# Patient Record
Sex: FEMALE | Race: WHITE | NOT HISPANIC OR LATINO | URBAN - METROPOLITAN AREA
[De-identification: names, ages, dates, MRNs, and addresses within clinical notes are randomized per-mention and may not be internally consistent; named-entity substitution may affect disease eponyms.]

---

## 2022-07-05 ENCOUNTER — OFFICE VISIT (OUTPATIENT)
Dept: RHEUMATOLOGY | Facility: CLINIC | Age: 62
End: 2022-07-05
Payer: MEDICAID

## 2022-07-05 VITALS
TEMPERATURE: 98 F | OXYGEN SATURATION: 98 % | SYSTOLIC BLOOD PRESSURE: 124 MMHG | HEIGHT: 64 IN | BODY MASS INDEX: 36.6 KG/M2 | DIASTOLIC BLOOD PRESSURE: 80 MMHG | HEART RATE: 80 BPM | WEIGHT: 214.38 LBS | RESPIRATION RATE: 18 BRPM

## 2022-07-05 DIAGNOSIS — G47.00 INSOMNIA, UNSPECIFIED TYPE: ICD-10-CM

## 2022-07-05 DIAGNOSIS — M79.7 FIBROMYALGIA SYNDROME: ICD-10-CM

## 2022-07-05 DIAGNOSIS — M05.9 SEROPOSITIVE RHEUMATOID ARTHRITIS: Primary | ICD-10-CM

## 2022-07-05 PROCEDURE — 3079F PR MOST RECENT DIASTOLIC BLOOD PRESSURE 80-89 MM HG: ICD-10-PCS | Mod: CPTII,,, | Performed by: INTERNAL MEDICINE

## 2022-07-05 PROCEDURE — 99204 PR OFFICE/OUTPT VISIT, NEW, LEVL IV, 45-59 MIN: ICD-10-PCS | Mod: S$PBB,,, | Performed by: INTERNAL MEDICINE

## 2022-07-05 PROCEDURE — 99999 PR PBB SHADOW E&M-NEW PATIENT-LVL III: CPT | Mod: PBBFAC,,, | Performed by: INTERNAL MEDICINE

## 2022-07-05 PROCEDURE — 1160F RVW MEDS BY RX/DR IN RCRD: CPT | Mod: CPTII,,, | Performed by: INTERNAL MEDICINE

## 2022-07-05 PROCEDURE — 3079F DIAST BP 80-89 MM HG: CPT | Mod: CPTII,,, | Performed by: INTERNAL MEDICINE

## 2022-07-05 PROCEDURE — 1159F PR MEDICATION LIST DOCUMENTED IN MEDICAL RECORD: ICD-10-PCS | Mod: CPTII,,, | Performed by: INTERNAL MEDICINE

## 2022-07-05 PROCEDURE — 99204 OFFICE O/P NEW MOD 45 MIN: CPT | Mod: S$PBB,,, | Performed by: INTERNAL MEDICINE

## 2022-07-05 PROCEDURE — 1159F MED LIST DOCD IN RCRD: CPT | Mod: CPTII,,, | Performed by: INTERNAL MEDICINE

## 2022-07-05 PROCEDURE — 3008F BODY MASS INDEX DOCD: CPT | Mod: CPTII,,, | Performed by: INTERNAL MEDICINE

## 2022-07-05 PROCEDURE — 3074F PR MOST RECENT SYSTOLIC BLOOD PRESSURE < 130 MM HG: ICD-10-PCS | Mod: CPTII,,, | Performed by: INTERNAL MEDICINE

## 2022-07-05 PROCEDURE — 99999 PR PBB SHADOW E&M-NEW PATIENT-LVL III: ICD-10-PCS | Mod: PBBFAC,,, | Performed by: INTERNAL MEDICINE

## 2022-07-05 PROCEDURE — 3008F PR BODY MASS INDEX (BMI) DOCUMENTED: ICD-10-PCS | Mod: CPTII,,, | Performed by: INTERNAL MEDICINE

## 2022-07-05 PROCEDURE — 1160F PR REVIEW ALL MEDS BY PRESCRIBER/CLIN PHARMACIST DOCUMENTED: ICD-10-PCS | Mod: CPTII,,, | Performed by: INTERNAL MEDICINE

## 2022-07-05 PROCEDURE — 99203 OFFICE O/P NEW LOW 30 MIN: CPT | Mod: PBBFAC | Performed by: INTERNAL MEDICINE

## 2022-07-05 PROCEDURE — 3074F SYST BP LT 130 MM HG: CPT | Mod: CPTII,,, | Performed by: INTERNAL MEDICINE

## 2022-07-05 RX ORDER — TIZANIDINE 2 MG/1
2 TABLET ORAL NIGHTLY
Qty: 30 TABLET | Refills: 5 | Status: SHIPPED | OUTPATIENT
Start: 2022-07-05 | End: 2022-11-17 | Stop reason: SDUPTHER

## 2022-07-05 RX ORDER — PANTOPRAZOLE SODIUM 40 MG/1
40 TABLET, DELAYED RELEASE ORAL DAILY
COMMUNITY
Start: 2022-07-04

## 2022-07-05 RX ORDER — DILTIAZEM HYDROCHLORIDE 60 MG/1
60 TABLET, FILM COATED ORAL DAILY PRN
COMMUNITY
Start: 2022-05-30

## 2022-07-05 RX ORDER — HYDROXYCHLOROQUINE SULFATE 200 MG/1
200 TABLET, FILM COATED ORAL 2 TIMES DAILY
Qty: 60 TABLET | Refills: 5 | Status: SHIPPED | OUTPATIENT
Start: 2022-07-05 | End: 2022-11-17 | Stop reason: SDUPTHER

## 2022-07-05 RX ORDER — FUROSEMIDE 20 MG/1
20 TABLET ORAL DAILY
COMMUNITY
Start: 2022-06-17

## 2022-07-05 NOTE — PROGRESS NOTES
"Subjective:       Patient ID: Shayy Chavez is a 61 y.o. female.    Chief Complaint: New Patient Self Referral (RA workup)    The patient is complaining of joint pain involving the MCP PIP wrist elbow shoulders hips knees and ankles bilaterally.  The pain is 9/10 in intensity dull in quality and continuous.  That is associated with a morning stiffness lasting for more than 60 minutes.  Is also having difficulty maintaining a good night of sleep.  This has been associated with myalgias.  Muscle aches are 9/10 in intensity dull in quality and continuous.  They are associated with fatigue.  No fever no chills no others.    Review of Systems   Constitutional: Negative for appetite change, chills and fever.   HENT: Negative for congestion, ear pain, mouth sores, nosebleeds and trouble swallowing.    Eyes: Negative for photophobia and discharge.   Respiratory: Negative for chest tightness and shortness of breath.    Cardiovascular: Negative for chest pain.   Gastrointestinal: Negative for abdominal pain and vomiting.   Endocrine: Negative.    Genitourinary: Negative for hematuria.   Musculoskeletal:        As per HPI   Skin: Negative for rash.   Neurological: Negative for weakness.         Objective:   /80 (BP Location: Right arm, Patient Position: Sitting, BP Method: Medium (Automatic))   Pulse 80   Temp 97.9 °F (36.6 °C)   Resp 18   Ht 5' 4" (1.626 m)   Wt 97.3 kg (214 lb 6.4 oz)   SpO2 98%   BMI 36.80 kg/m²      Physical Exam   Constitutional: She is oriented to person, place, and time. She appears well-developed and well-nourished. No distress.   HENT:   Head: Normocephalic and atraumatic.   Right Ear: External ear normal.   Left Ear: External ear normal.   Eyes: Pupils are equal, round, and reactive to light.   Cardiovascular: Normal rate, regular rhythm and normal heart sounds.   Pulmonary/Chest: Breath sounds normal.   Abdominal: Soft. There is no abdominal tenderness.   Musculoskeletal:      " Right shoulder: Tenderness present.      Left shoulder: Tenderness present.      Right elbow: Tenderness present.      Left elbow: Tenderness present.      Right wrist: Tenderness present.      Left wrist: Tenderness present.      Cervical back: Neck supple.      Right hip: Tenderness present.      Left hip: Tenderness present.      Right knee: Tenderness present.      Left knee: Tenderness present.      Right ankle: Tenderness present.      Left ankle: Tenderness present.   Lymphadenopathy:     She has no cervical adenopathy.   Neurological: She is alert and oriented to person, place, and time. She displays normal reflexes. No cranial nerve deficit or sensory deficit. She exhibits normal muscle tone. Coordination normal.   Skin: No rash noted. No erythema.   Vitals reviewed.      Right Side Rheumatological Exam     The patient is tender to palpation of the shoulder, elbow, wrist, knee, 1st PIP, 1st MCP, 2nd PIP, 2nd MCP, 3rd PIP, 3rd MCP, 4th PIP, 4th MCP, 5th PIP, hip, ankle, 1st MTP, 2nd MTP, 3rd MTP, 4th MTP, 5th MTP, 1st toe IP, 2nd toe IP, 3rd toe IP, 4th toe IP and 5th toe IP    Left Side Rheumatological Exam     The patient is tender to palpation of the shoulder, elbow, wrist, knee, 1st PIP, 1st MCP, 2nd PIP, 2nd MCP, 3rd PIP, 3rd MCP, 4th PIP, 4th MCP, 5th PIP, 5th MCP, hip, ankle, 1st MTP, 2nd MTP, 3rd MTP, 4th MTP, 5th MTP, 1st toe IP, 2nd toe IP, 3rd toe IP, 4th toe IP and 5th toe IP.         Completed Fibromyalgia exam 18/18 tender points.  No data to display     Assessment:       1. Seropositive rheumatoid arthritis    2. Fibromyalgia syndrome    3. Insomnia, unspecified type            Plan:       Problem List Items Addressed This Visit    None     Visit Diagnoses     Seropositive rheumatoid arthritis    -  Primary    Relevant Medications    hydrOXYchloroQUINE (PLAQUENIL) 200 mg tablet    tiZANidine (ZANAFLEX) 2 MG tablet    Other Relevant Orders    CBC Auto Differential    Comprehensive Metabolic  Panel    CRP, High Sensitivity    Vitamin D    Urinalysis    Antinuclear Antibody (CHELSEA), HEp-2, IgG    Rheumatoid Quantitative    Cyclic Citrullinated Peptide Antibody, IgG    Anti-Smith Antibody    DSDNA IgG By IFA    Fibromyalgia syndrome        Relevant Medications    hydrOXYchloroQUINE (PLAQUENIL) 200 mg tablet    tiZANidine (ZANAFLEX) 2 MG tablet    Other Relevant Orders    CBC Auto Differential    Comprehensive Metabolic Panel    CRP, High Sensitivity    Vitamin D    Urinalysis    Antinuclear Antibody (CHELSEA), HEp-2, IgG    Rheumatoid Quantitative    Cyclic Citrullinated Peptide Antibody, IgG    Anti-Smith Antibody    DSDNA IgG By IFA    Insomnia, unspecified type        Relevant Medications    hydrOXYchloroQUINE (PLAQUENIL) 200 mg tablet    tiZANidine (ZANAFLEX) 2 MG tablet    Other Relevant Orders    CBC Auto Differential    Comprehensive Metabolic Panel    CRP, High Sensitivity    Vitamin D    Urinalysis    Antinuclear Antibody (CHELSEA), HEp-2, IgG    Rheumatoid Quantitative    Cyclic Citrullinated Peptide Antibody, IgG    Anti-Smith Antibody    DSDNA IgG By IFA

## 2022-11-16 PROBLEM — M79.7 FIBROMYALGIA: Status: ACTIVE | Noted: 2022-11-16

## 2022-11-16 PROBLEM — M05.9 SEROPOSITIVE RHEUMATOID ARTHRITIS: Status: ACTIVE | Noted: 2022-11-16

## 2022-11-16 PROBLEM — F51.01 PRIMARY INSOMNIA: Status: ACTIVE | Noted: 2022-11-16

## 2022-11-17 ENCOUNTER — OFFICE VISIT (OUTPATIENT)
Dept: RHEUMATOLOGY | Facility: CLINIC | Age: 62
End: 2022-11-17
Payer: MEDICAID

## 2022-11-17 VITALS
HEIGHT: 64 IN | WEIGHT: 220.38 LBS | BODY MASS INDEX: 37.62 KG/M2 | OXYGEN SATURATION: 97 % | RESPIRATION RATE: 18 BRPM | SYSTOLIC BLOOD PRESSURE: 114 MMHG | DIASTOLIC BLOOD PRESSURE: 75 MMHG | HEART RATE: 69 BPM | TEMPERATURE: 99 F

## 2022-11-17 DIAGNOSIS — M05.9 SEROPOSITIVE RHEUMATOID ARTHRITIS: ICD-10-CM

## 2022-11-17 DIAGNOSIS — M79.7 FIBROMYALGIA SYNDROME: ICD-10-CM

## 2022-11-17 DIAGNOSIS — G47.00 INSOMNIA, UNSPECIFIED TYPE: ICD-10-CM

## 2022-11-17 DIAGNOSIS — F51.01 PRIMARY INSOMNIA: ICD-10-CM

## 2022-11-17 DIAGNOSIS — M79.7 FIBROMYALGIA: ICD-10-CM

## 2022-11-17 PROCEDURE — 99213 OFFICE O/P EST LOW 20 MIN: CPT | Mod: PBBFAC | Performed by: INTERNAL MEDICINE

## 2022-11-17 PROCEDURE — 3078F DIAST BP <80 MM HG: CPT | Mod: CPTII,,, | Performed by: INTERNAL MEDICINE

## 2022-11-17 PROCEDURE — 99214 OFFICE O/P EST MOD 30 MIN: CPT | Mod: S$PBB,,, | Performed by: INTERNAL MEDICINE

## 2022-11-17 PROCEDURE — 99999 PR PBB SHADOW E&M-EST. PATIENT-LVL III: ICD-10-PCS | Mod: PBBFAC,,, | Performed by: INTERNAL MEDICINE

## 2022-11-17 PROCEDURE — 3008F PR BODY MASS INDEX (BMI) DOCUMENTED: ICD-10-PCS | Mod: CPTII,,, | Performed by: INTERNAL MEDICINE

## 2022-11-17 PROCEDURE — 3074F PR MOST RECENT SYSTOLIC BLOOD PRESSURE < 130 MM HG: ICD-10-PCS | Mod: CPTII,,, | Performed by: INTERNAL MEDICINE

## 2022-11-17 PROCEDURE — 1160F PR REVIEW ALL MEDS BY PRESCRIBER/CLIN PHARMACIST DOCUMENTED: ICD-10-PCS | Mod: CPTII,,, | Performed by: INTERNAL MEDICINE

## 2022-11-17 PROCEDURE — 99999 PR PBB SHADOW E&M-EST. PATIENT-LVL III: CPT | Mod: PBBFAC,,, | Performed by: INTERNAL MEDICINE

## 2022-11-17 PROCEDURE — 3074F SYST BP LT 130 MM HG: CPT | Mod: CPTII,,, | Performed by: INTERNAL MEDICINE

## 2022-11-17 PROCEDURE — 3078F PR MOST RECENT DIASTOLIC BLOOD PRESSURE < 80 MM HG: ICD-10-PCS | Mod: CPTII,,, | Performed by: INTERNAL MEDICINE

## 2022-11-17 PROCEDURE — 1159F PR MEDICATION LIST DOCUMENTED IN MEDICAL RECORD: ICD-10-PCS | Mod: CPTII,,, | Performed by: INTERNAL MEDICINE

## 2022-11-17 PROCEDURE — 1159F MED LIST DOCD IN RCRD: CPT | Mod: CPTII,,, | Performed by: INTERNAL MEDICINE

## 2022-11-17 PROCEDURE — 99214 PR OFFICE/OUTPT VISIT, EST, LEVL IV, 30-39 MIN: ICD-10-PCS | Mod: S$PBB,,, | Performed by: INTERNAL MEDICINE

## 2022-11-17 PROCEDURE — 1160F RVW MEDS BY RX/DR IN RCRD: CPT | Mod: CPTII,,, | Performed by: INTERNAL MEDICINE

## 2022-11-17 PROCEDURE — 3008F BODY MASS INDEX DOCD: CPT | Mod: CPTII,,, | Performed by: INTERNAL MEDICINE

## 2022-11-17 RX ORDER — TIZANIDINE 4 MG/1
4 TABLET ORAL NIGHTLY
Qty: 30 TABLET | Refills: 5 | Status: SHIPPED | OUTPATIENT
Start: 2022-11-17 | End: 2023-05-16

## 2022-11-17 RX ORDER — METHOTREXATE 2.5 MG/1
10 TABLET ORAL
Qty: 20 TABLET | Refills: 5 | Status: SHIPPED | OUTPATIENT
Start: 2022-11-17 | End: 2023-01-04

## 2022-11-17 RX ORDER — HYDROXYCHLOROQUINE SULFATE 200 MG/1
200 TABLET, FILM COATED ORAL 2 TIMES DAILY
Qty: 60 TABLET | Refills: 5 | Status: SHIPPED | OUTPATIENT
Start: 2022-11-17 | End: 2023-05-20

## 2022-11-17 RX ORDER — ALPRAZOLAM 0.25 MG/1
0.25 TABLET ORAL 2 TIMES DAILY PRN
COMMUNITY

## 2022-11-17 RX ORDER — FOLIC ACID 1 MG/1
1 TABLET ORAL DAILY
Qty: 30 TABLET | Refills: 5 | Status: SHIPPED | OUTPATIENT
Start: 2022-11-17 | End: 2023-05-16

## 2022-11-17 NOTE — PROGRESS NOTES
"Subjective:       Patient ID: Shayy Chavez is a 61 y.o. female.    Chief Complaint: Follow-up (Follow up. Pain in joints. Pt has nausea at times in the morning and at night after taking meds)    The patient is complaining of joint pain involving the MCP PIP wrist elbow shoulders hips knees and ankles bilaterally.  The pain is 4/10 in intensity dull in quality and continuous.  That is associated with a morning stiffness lasting for more than 60 minutes.  Is also having difficulty maintaining a good night of sleep.  This has been associated with myalgias.  Muscle aches are 4/10 in intensity dull in quality and continuous.  They are associated with fatigue.  No fever no chills no others.      Review of Systems   Constitutional:  Negative for appetite change, chills and fever.   HENT:  Negative for congestion, ear pain, mouth sores, nosebleeds and trouble swallowing.    Eyes:  Negative for photophobia and discharge.   Respiratory:  Negative for chest tightness and shortness of breath.    Cardiovascular:  Negative for chest pain.   Gastrointestinal:  Negative for abdominal pain and vomiting.   Endocrine: Negative.    Genitourinary:  Negative for hematuria.   Musculoskeletal:         As per HPI   Skin:  Negative for rash.   Neurological:  Negative for weakness.       Objective:   /75 (BP Location: Left arm, Patient Position: Sitting, BP Method: Medium (Automatic))   Pulse 69   Temp 98.7 °F (37.1 °C) (Oral)   Resp 18   Ht 5' 4" (1.626 m)   Wt 100 kg (220 lb 6.4 oz)   SpO2 97%   BMI 37.83 kg/m²      Physical Exam   Constitutional: She is oriented to person, place, and time. She appears well-developed and well-nourished. No distress.   HENT:   Head: Normocephalic and atraumatic.   Right Ear: External ear normal.   Left Ear: External ear normal.   Eyes: Pupils are equal, round, and reactive to light.   Cardiovascular: Normal rate, regular rhythm and normal heart sounds.   Pulmonary/Chest: Breath sounds " normal.   Abdominal: Soft. There is no abdominal tenderness.   Musculoskeletal:      Right shoulder: Tenderness present.      Left shoulder: Tenderness present.      Right elbow: Tenderness present.      Left elbow: Tenderness present.      Right wrist: Tenderness present.      Left wrist: Tenderness present.      Cervical back: Neck supple.      Right hip: Tenderness present.      Left hip: Tenderness present.      Right knee: Tenderness present.      Left knee: Tenderness present.      Right ankle: Tenderness present.      Left ankle: Tenderness present.   Lymphadenopathy:     She has no cervical adenopathy.   Neurological: She is alert and oriented to person, place, and time. She displays normal reflexes. No cranial nerve deficit or sensory deficit. She exhibits normal muscle tone. Coordination normal.   Skin: No rash noted. No erythema.   Vitals reviewed.      Right Side Rheumatological Exam     The patient is tender to palpation of the shoulder, elbow, wrist, knee, 1st PIP, 1st MCP, 2nd PIP, 2nd MCP, 3rd PIP, 3rd MCP, 4th PIP, 4th MCP, 5th PIP, hip, ankle, 1st MTP, 2nd MTP, 3rd MTP, 4th MTP, 5th MTP, 1st toe IP, 2nd toe IP, 3rd toe IP, 4th toe IP and 5th toe IP    Left Side Rheumatological Exam     The patient is tender to palpation of the shoulder, elbow, wrist, knee, 1st PIP, 1st MCP, 2nd PIP, 2nd MCP, 3rd PIP, 3rd MCP, 4th PIP, 4th MCP, 5th PIP, 5th MCP, hip, ankle, 1st MTP, 2nd MTP, 3rd MTP, 4th MTP, 5th MTP, 1st toe IP, 2nd toe IP, 3rd toe IP, 4th toe IP and 5th toe IP.       Completed Fibromyalgia exam 18/18 tender points.  No data to display     Assessment:       1. Seropositive rheumatoid arthritis    2. Fibromyalgia    3. Primary insomnia    4. Fibromyalgia syndrome    5. Insomnia, unspecified type            Medication List with Changes/Refills   New Medications    FOLIC ACID (FOLVITE) 1 MG TABLET    Take 1 tablet (1 mg total) by mouth once daily. After food       Start Date: 11/17/2022End Date:  5/16/2023    METHOTREXATE 2.5 MG TAB    Take 4 tablets (10 mg total) by mouth every 7 days. EVERY        TAKE 4 TAB TOGETHER AFTER SUPPER       Start Date: 11/17/2022End Date: 5/16/2023   Current Medications    ALPRAZOLAM (XANAX) 0.25 MG TABLET    Take 0.25 mg by mouth 2 (two) times daily as needed.       Start Date: --        End Date: --    DILTIAZEM (CARDIZEM) 60 MG TABLET    Take 60 mg by mouth daily as needed.       Start Date: 5/30/2022 End Date: --    FUROSEMIDE (LASIX) 20 MG TABLET    Take 20 mg by mouth once daily.       Start Date: 6/17/2022 End Date: --    PANTOPRAZOLE (PROTONIX) 40 MG TABLET    Take 40 mg by mouth once daily.       Start Date: 7/4/2022  End Date: --   Changed and/or Refilled Medications    Modified Medication Previous Medication    HYDROXYCHLOROQUINE (PLAQUENIL) 200 MG TABLET hydrOXYchloroQUINE (PLAQUENIL) 200 mg tablet       Take 1 tablet (200 mg total) by mouth 2 (two) times daily. After food    Take 1 tablet (200 mg total) by mouth 2 (two) times daily. After food       Start Date: 11/17/2022End Date: 5/20/2023    Start Date: 7/5/2022  End Date: 11/17/2022    TIZANIDINE (ZANAFLEX) 4 MG TABLET tiZANidine (ZANAFLEX) 2 MG tablet       Take 1 tablet (4 mg total) by mouth nightly.    Take 1 tablet (2 mg total) by mouth nightly.       Start Date: 11/17/2022End Date: 5/16/2023    Start Date: 7/5/2022  End Date: 11/17/2022         Plan:       Problem List Items Addressed This Visit          Immunology/Multi System    Seropositive rheumatoid arthritis    Relevant Medications    hydrOXYchloroQUINE (PLAQUENIL) 200 mg tablet    tiZANidine (ZANAFLEX) 4 MG tablet    methotrexate 2.5 MG Tab    folic acid (FOLVITE) 1 MG tablet       Orthopedic    Fibromyalgia    Relevant Medications    hydrOXYchloroQUINE (PLAQUENIL) 200 mg tablet    tiZANidine (ZANAFLEX) 4 MG tablet    methotrexate 2.5 MG Tab    folic acid (FOLVITE) 1 MG tablet       Other    Primary insomnia    Relevant Medications     hydrOXYchloroQUINE (PLAQUENIL) 200 mg tablet    tiZANidine (ZANAFLEX) 4 MG tablet    methotrexate 2.5 MG Tab    folic acid (FOLVITE) 1 MG tablet     Other Visit Diagnoses       Fibromyalgia syndrome        Relevant Medications    hydrOXYchloroQUINE (PLAQUENIL) 200 mg tablet    tiZANidine (ZANAFLEX) 4 MG tablet    methotrexate 2.5 MG Tab    folic acid (FOLVITE) 1 MG tablet    Insomnia, unspecified type        Relevant Medications    hydrOXYchloroQUINE (PLAQUENIL) 200 mg tablet    tiZANidine (ZANAFLEX) 4 MG tablet    methotrexate 2.5 MG Tab    folic acid (FOLVITE) 1 MG tablet

## 2022-12-21 ENCOUNTER — TELEPHONE (OUTPATIENT)
Dept: RHEUMATOLOGY | Facility: CLINIC | Age: 62
End: 2022-12-21
Payer: MEDICAID

## 2022-12-21 NOTE — TELEPHONE ENCOUNTER
Patient called stating that her Blood UREA Nitrogen was 27 and the EST Glomerular filtration rate was 87 should she be concerned ? She did read online and she read that this is signs of kidney damage ... Does the methotrexate has something to do with her rates being high?? Please Advise.. Thanks

## 2022-12-21 NOTE — TELEPHONE ENCOUNTER
I do not see any labs completed under results review.  Did she have these labs done with an [facility] facility?  If not, please have the patient send her labs to our office.

## 2022-12-22 NOTE — TELEPHONE ENCOUNTER
Patients outside labs are scanned into the chart.. could you please review.. Please Advise.. Thanks

## 2022-12-22 NOTE — TELEPHONE ENCOUNTER
Recent labs that were scanned into the chart were reviewed.  These blood tests look good.  The GFR decreases as people age. The GFR will decrease in people even without kidney issues. Your GFR of 87 is normal and actually pretty good for someone who is 62 years of age. The BUN is typically associated with dehydration and could be elevated (like in your case) from that. It is only mildly elevated so it is not concerning. We do not use primarily the BUN for kidney function. We use the creatinine and GFR, both of which are normal.  Your liver functions are also normal.

## 2023-01-04 ENCOUNTER — TELEPHONE (OUTPATIENT)
Dept: RHEUMATOLOGY | Facility: CLINIC | Age: 63
End: 2023-01-04
Payer: MEDICAID

## 2023-01-04 RX ORDER — LEFLUNOMIDE 20 MG/1
20 TABLET ORAL
Qty: 30 TABLET | Refills: 11 | Status: SHIPPED | OUTPATIENT
Start: 2023-01-04 | End: 2024-01-04

## 2023-01-04 NOTE — TELEPHONE ENCOUNTER
Patient states that since she started taking the methotrexate she has been swelling, nauseated and vomiting. She was wondering if you could recommend something else.  Thank you Kandis

## 2024-04-01 ENCOUNTER — TELEPHONE (OUTPATIENT)
Dept: RHEUMATOLOGY | Facility: CLINIC | Age: 64
End: 2024-04-01
Payer: MEDICAID

## 2024-04-05 ENCOUNTER — HOSPITAL ENCOUNTER (OUTPATIENT)
Dept: RADIOLOGY | Facility: HOSPITAL | Age: 64
Discharge: HOME | End: 2024-04-05
Payer: MEDICAID

## 2024-04-05 ENCOUNTER — OFFICE VISIT (OUTPATIENT)
Dept: RHEUMATOLOGY | Facility: CLINIC | Age: 64
End: 2024-04-05
Payer: MEDICAID

## 2024-04-05 VITALS
WEIGHT: 206.19 LBS | HEART RATE: 63 BPM | RESPIRATION RATE: 20 BRPM | OXYGEN SATURATION: 98 % | HEIGHT: 64 IN | TEMPERATURE: 98 F | DIASTOLIC BLOOD PRESSURE: 84 MMHG | SYSTOLIC BLOOD PRESSURE: 123 MMHG | BODY MASS INDEX: 35.2 KG/M2

## 2024-04-05 DIAGNOSIS — I48.91 ATRIAL FIBRILLATION, UNSPECIFIED TYPE: ICD-10-CM

## 2024-04-05 DIAGNOSIS — M06.00 SERONEGATIVE RHEUMATOID ARTHRITIS: ICD-10-CM

## 2024-04-05 DIAGNOSIS — E55.9 VITAMIN D DEFICIENCY: ICD-10-CM

## 2024-04-05 DIAGNOSIS — K21.9 GASTROESOPHAGEAL REFLUX DISEASE, UNSPECIFIED WHETHER ESOPHAGITIS PRESENT: ICD-10-CM

## 2024-04-05 DIAGNOSIS — M79.7 FIBROMYALGIA: ICD-10-CM

## 2024-04-05 DIAGNOSIS — F51.01 PRIMARY INSOMNIA: ICD-10-CM

## 2024-04-05 DIAGNOSIS — M06.00 SERONEGATIVE RHEUMATOID ARTHRITIS: Primary | ICD-10-CM

## 2024-04-05 DIAGNOSIS — E11.9 TYPE 2 DIABETES MELLITUS WITHOUT COMPLICATION, WITHOUT LONG-TERM CURRENT USE OF INSULIN: ICD-10-CM

## 2024-04-05 DIAGNOSIS — R76.8 POSITIVE ANA (ANTINUCLEAR ANTIBODY): ICD-10-CM

## 2024-04-05 PROCEDURE — 3079F DIAST BP 80-89 MM HG: CPT | Mod: CPTII,,,

## 2024-04-05 PROCEDURE — 71046 X-RAY EXAM CHEST 2 VIEWS: CPT | Mod: TC

## 2024-04-05 PROCEDURE — 73130 X-RAY EXAM OF HAND: CPT | Mod: TC,LT

## 2024-04-05 PROCEDURE — 73630 X-RAY EXAM OF FOOT: CPT | Mod: TC,RT

## 2024-04-05 PROCEDURE — 99999 PR PBB SHADOW E&M-EST. PATIENT-LVL V: CPT | Mod: PBBFAC,,,

## 2024-04-05 PROCEDURE — 1159F MED LIST DOCD IN RCRD: CPT | Mod: CPTII,,,

## 2024-04-05 PROCEDURE — 99215 OFFICE O/P EST HI 40 MIN: CPT | Mod: PBBFAC,25

## 2024-04-05 PROCEDURE — 73630 X-RAY EXAM OF FOOT: CPT | Mod: TC,LT

## 2024-04-05 PROCEDURE — 73130 X-RAY EXAM OF HAND: CPT | Mod: TC,RT

## 2024-04-05 PROCEDURE — 3074F SYST BP LT 130 MM HG: CPT | Mod: CPTII,,,

## 2024-04-05 PROCEDURE — 99215 OFFICE O/P EST HI 40 MIN: CPT | Mod: S$PBB,,,

## 2024-04-05 PROCEDURE — 3008F BODY MASS INDEX DOCD: CPT | Mod: CPTII,,,

## 2024-04-05 RX ORDER — DILTIAZEM HYDROCHLORIDE 30 MG/1
30 TABLET, FILM COATED ORAL DAILY
COMMUNITY

## 2024-04-05 RX ORDER — APIXABAN 5 MG/1
5 TABLET, FILM COATED ORAL 2 TIMES DAILY
COMMUNITY
Start: 2024-03-12

## 2024-04-05 RX ORDER — EMPAGLIFLOZIN 10 MG/1
10 TABLET, FILM COATED ORAL DAILY
COMMUNITY
Start: 2024-03-14

## 2024-04-05 NOTE — ASSESSMENT & PLAN NOTE
Past treatment: July 2022 Initiated on  mg BID at initial visit. In November 2022 initiated MTX. MTX was discontinued in January 2023 d/t side effects of nausea, vomiting and swelling. Leflunomide was Initiated Jan 2023    Will Check labs today- if OK the plan is to restart Leflunomide 20 mg daily after supper.     Lab today for continued monitoring and will include infectious labs for potential future biologic therapy  Request Cardiology office note and ECHO to see if we are able to use TNFi    Xray of bilateral hands, bilateral feet and CXR ordered today

## 2024-04-05 NOTE — PROGRESS NOTES
Subjective:           Patient ID: Shayy Chavez is a 63 y.o. female.    Chief Complaint: Follow-up, Pain, and Swelling (Two middle fingers on right hand locking up, having trouble holding and grasping things . Generalized joint pain and swelling . Patient is taking tylenol .)      Ms. Chavez is a 62 y/o female here for a f/u. She was originally a self referral for joint pain. She established care with Dr. Lam 7/5/22.  Reports hand pain started in 2012- reports she was diagnosed with RA from xrays and blood works.   Past labs: 5/13/22- Anti-centromere Ab neg, Anti-RNP neg, Anti Smith Ab neg, Eufemia-1 Ab neg, Ribosomal P Ab neg, Scleroderma Ab neg, Sjogrens SSA neg, Sjogrens SSB neg, SSA60 Ab IGG neg, CCP neg,    10/14/22- CHELSEA 1:80 Speckled, dsDNA neg, Smith neg, RF neg CCP neg, RF neg  Past treatment: July 2022 Initiated on  mg BID at initial visit. In November 2022 initiated MTX. MTX was discontinued in January 2023 d/t side effects of nausea, vomiting and swelling. Leflunomide was Initiated Jan 2023    Hx of MI 2011- stent   CHF?  Followed by Cardiology Dr Cordero- Cyndie CALERO on Eliquis and ASA  Followed by GI- Dr Merino    Denies history of fevers, rashes, photosensitivity, oral or nasal ulcers,stroke, seizures, h/o PE or DVT, Raynaud's phenomenon, uveitis, malignancies.      Family history of autoimmune disease: Brother and sister have RA  Pregnancies: 3  Miscarriages: 1 (3 months)  Smoking: Previous Smoker for 8-10 years. Quit 2011     Today: First time evaluated by me. Last visit was 1.5 years ago November 2022. She is not on any treatment. C/o of joint pain in hands, knees, ankles, shoulders. Bilateral hand are weak on . She tries to stay activity with walking in yard. Morning stiffness: more than 3-4 hours. Endorses red/warm/swollen joints. She is unable to wear rings regularly due to swelling. Active synovitis on exam today.  Intentional weight loss recently by eating healthy and  "increased activity. She also reports she recently completed physical therapy for her lower back with improvement.       Review of Systems   Constitutional:  Negative for appetite change, chills and fever.   HENT:  Negative for congestion, ear pain, mouth sores, nosebleeds and trouble swallowing.    Eyes:  Negative for photophobia and discharge.   Respiratory:  Negative for chest tightness and shortness of breath.    Cardiovascular:  Negative for chest pain.   Gastrointestinal:  Negative for abdominal pain and vomiting.   Endocrine: Negative.    Genitourinary:  Negative for hematuria.   Musculoskeletal:  Positive for arthralgias, joint swelling and myalgias.        As per HPI   Skin:  Negative for rash.   Neurological:  Positive for numbness. Negative for weakness.        Occasional tingling in bilateral hands         Objective:   /84 (BP Location: Right arm, Patient Position: Sitting, BP Method: Medium (Automatic))   Pulse 63   Temp 97.7 °F (36.5 °C) (Oral)   Resp 20   Ht 5' 4" (1.626 m)   Wt 93.5 kg (206 lb 3.2 oz)   SpO2 98%   BMI 35.39 kg/m²          Physical Exam   Constitutional: She is oriented to person, place, and time. She appears well-developed and well-nourished. No distress.   HENT:   Head: Normocephalic and atraumatic.   Right Ear: External ear normal.   Left Ear: External ear normal.   Eyes: Pupils are equal, round, and reactive to light.   Cardiovascular: Normal rate and normal heart sounds.   Pulmonary/Chest: Breath sounds normal.   Abdominal: Soft. There is no abdominal tenderness.   Musculoskeletal:      Right elbow: Tenderness present.      Left elbow: Tenderness present.      Right wrist: Tenderness present.      Left wrist: Tenderness present.      Cervical back: Neck supple.      Right knee: Tenderness present.      Left knee: Tenderness present.      Right ankle: Tenderness present.      Left ankle: Tenderness present.   Lymphadenopathy:     She has no cervical adenopathy. "   Neurological: She is alert and oriented to person, place, and time. She displays normal reflexes. No cranial nerve deficit or sensory deficit. She exhibits normal muscle tone. Coordination normal.   Skin: No rash noted. No erythema.   Vitals reviewed.      Right Side Rheumatological Exam     The patient is tender to palpation of the elbow, wrist, knee, 1st PIP, 1st MCP, 2nd PIP, 2nd MCP, 3rd PIP, 3rd MCP, 4th PIP, 4th MCP, 5th PIP, 5th MCP, ankle, 1st MTP, 2nd MTP, 3rd MTP, 4th MTP and 5th MTP    Left Side Rheumatological Exam     The patient is tender to palpation of the elbow, wrist, knee, 1st PIP, 1st MCP, 2nd PIP, 2nd MCP, 3rd PIP, 3rd MCP, 4th PIP, 4th MCP, 5th PIP, 5th MCP, ankle, 1st MTP, 2nd MTP, 3rd MTP, 4th MTP and 5th MTP.           There is currently no information documented on the homunculus. Go to the Rheumatology activity and complete the homunculus joint exam.    CDAI Score: --    No data to display     Assessment:         Medication List with Changes/Refills   Current Medications    ALPRAZOLAM (XANAX) 0.25 MG TABLET    Take 0.25 mg by mouth 2 (two) times daily as needed.       Start Date: --        End Date: --    DILTIAZEM (CARDIZEM) 30 MG TABLET    Take 30 mg by mouth Daily.       Start Date: --        End Date: --    ELIQUIS 5 MG TAB    Take 5 mg by mouth 2 (two) times daily.       Start Date: 3/12/2024 End Date: --    FUROSEMIDE (LASIX) 20 MG TABLET    Take 20 mg by mouth once daily.       Start Date: 6/17/2022 End Date: --    JARDIANCE 10 MG TABLET    Take 10 mg by mouth once daily.       Start Date: 3/14/2024 End Date: --    PANTOPRAZOLE (PROTONIX) 40 MG TABLET    Take 40 mg by mouth once daily.       Start Date: 7/4/2022  End Date: --   Discontinued Medications    DILTIAZEM (CARDIZEM) 60 MG TABLET    Take 60 mg by mouth daily as needed.       Start Date: 5/30/2022 End Date: 4/5/2024    FOLIC ACID (FOLVITE) 1 MG TABLET    Take 1 tablet (1 mg total) by mouth once daily. After food        Start Date: 11/17/2022End Date: 4/5/2024    LEFLUNOMIDE (ARAVA) 20 MG TAB    Take 1 tablet (20 mg total) by mouth daily with dinner or evening meal.       Start Date: 1/4/2023  End Date: 4/5/2024         ICD-10-CM ICD-9-CM   1. Seronegative rheumatoid arthritis  M06.00 714.0   2. Positive CHELSEA (antinuclear antibody)  R76.8 795.79   3. Fibromyalgia  M79.7 729.1   4. Primary insomnia  F51.01 307.42   5. Type 2 diabetes mellitus without complication, without long-term current use of insulin  E11.9 250.00   6. Atrial fibrillation, unspecified type  I48.91 427.31   7. Gastroesophageal reflux disease, unspecified whether esophagitis present  K21.9 530.81   8. Vitamin D deficiency  E55.9 268.9           Plan:       1. Seronegative rheumatoid arthritis  Assessment & Plan:  Past treatment: July 2022 Initiated on  mg BID at initial visit. In November 2022 initiated MTX. MTX was discontinued in January 2023 d/t side effects of nausea, vomiting and swelling. Leflunomide was Initiated Jan 2023    Will Check labs today- if OK the plan is to restart Leflunomide 20 mg daily after supper.     Lab today for continued monitoring and will include infectious labs for potential future biologic therapy  Request Cardiology office note and ECHO to see if we are able to use TNFi    Xray of bilateral hands, bilateral feet and CXR ordered today    Orders:  -     HIV 1/2 Ag/Ab (4th Gen); Future; Expected date: 04/05/2024  -     Quantiferon Gold TB; Future; Expected date: 04/05/2024  -     Hepatitis C Antibody; Future; Expected date: 04/05/2024  -     Hepatitis B Surface Antigen; Future; Expected date: 04/05/2024  -     Hepatitis B Core Antibody, Total; Future; Expected date: 04/05/2024  -     Hepatitis B Surface Ab, Qualitative; Future; Expected date: 04/05/2024  -     Sedimentation rate; Future; Expected date: 04/05/2024  -     C-Reactive Protein; Future; Expected date: 04/05/2024  -     Comprehensive Metabolic Panel; Future; Expected  date: 04/05/2024  -     CBC Auto Differential; Future; Expected date: 04/05/2024  -     Rheumatoid Factors, IgA, IgG, IgM; Future; Expected date: 04/05/2024  -     CHELSEA IgG by IFA; Future; Expected date: 04/05/2024  -     DSDNA; Future; Expected date: 04/05/2024  -     C3 Complement; Future; Expected date: 04/05/2024  -     C4 Complement; Future; Expected date: 04/05/2024  -     Miscellaneous Test, Sendout RNA polymerase III; Future; Expected date: 04/05/2024  -     X-Ray Hand 3 view Left; Future; Expected date: 04/05/2024  -     X-Ray Hand 3 view Right; Future; Expected date: 04/05/2024  -     X-Ray Foot Complete Right; Future; Expected date: 04/05/2024  -     X-Ray Foot Complete Left; Future; Expected date: 04/05/2024  -     X-Ray Chest PA And Lateral; Future; Expected date: 04/05/2024    2. Positive CHELSEA (antinuclear antibody)  Assessment & Plan:  CHELSEA 1:80 Speckled 2022    Labs ordered for workup of positive CHELSEA including HAM and complements    Orders:  -     Sedimentation rate; Future; Expected date: 04/05/2024  -     C-Reactive Protein; Future; Expected date: 04/05/2024  -     Comprehensive Metabolic Panel; Future; Expected date: 04/05/2024  -     CBC Auto Differential; Future; Expected date: 04/05/2024  -     CHELSEA IgG by IFA; Future; Expected date: 04/05/2024  -     DSDNA; Future; Expected date: 04/05/2024  -     C3 Complement; Future; Expected date: 04/05/2024  -     C4 Complement; Future; Expected date: 04/05/2024  -     Miscellaneous Test, Sendout RNA polymerase III; Future; Expected date: 04/05/2024    3. Fibromyalgia  Assessment & Plan:  Stable      4. Primary insomnia  Assessment & Plan:  Avoid caffeine, alcohol and stimulants.  Power down electronic devices at least one hour prior to bedtime.  Keep room dark; use eye mask or relaxation sound machine to promote rest.  Sleep hygiene refers to actions that tend to improve and maintain good sleep:  Sleep as long as necessary to feel rested (usually seven to  eight hours for adults) and then get out of bed  Maintain a regular sleep schedule, particularly a regular wake-up time in the morning  Avoid smoking or other nicotine intake, particularly during the evening          5. Type 2 diabetes mellitus without complication, without long-term current use of insulin    6. Atrial fibrillation, unspecified type  Assessment & Plan:  On Eliquis  Followed by Cardiology      7. Gastroesophageal reflux disease, unspecified whether esophagitis present    8. Vitamin D deficiency  Assessment & Plan:  Check Vit D today    Orders:  -     Vitamin D; Future; Expected date: 04/05/2024           42 minutes of total time spent on the encounter, which includes face to face time and non-face to face time preparing to see the patient (eg, review of tests), Obtaining and/or reviewing separately obtained history, Documenting clinical information in the electronic or other health record, Independently interpreting results (not separately reported) and communicating results to the patient/family/caregiver, or Care coordination (not separately reported).

## 2024-04-09 PROBLEM — E55.9 VITAMIN D DEFICIENCY: Status: ACTIVE | Noted: 2024-04-09

## 2024-04-09 PROBLEM — R76.8 POSITIVE ANA (ANTINUCLEAR ANTIBODY): Status: ACTIVE | Noted: 2024-04-09

## 2024-04-09 NOTE — ASSESSMENT & PLAN NOTE
CHELSEA 1:80 Speckled 2022    Labs ordered for workup of positive CHELSEA including HAM and complements

## 2024-04-10 ENCOUNTER — TELEPHONE (OUTPATIENT)
Dept: RHEUMATOLOGY | Facility: CLINIC | Age: 64
End: 2024-04-10
Payer: MEDICAID

## 2024-04-10 DIAGNOSIS — M06.00 SERONEGATIVE RHEUMATOID ARTHRITIS: Primary | ICD-10-CM

## 2024-04-10 RX ORDER — LEFLUNOMIDE 20 MG/1
20 TABLET ORAL DAILY
Qty: 30 TABLET | Refills: 5 | Status: SHIPPED | OUTPATIENT
Start: 2024-04-10

## 2024-04-10 NOTE — TELEPHONE ENCOUNTER
Labs reviewed- Vit D is slightly low- she can do OTC Vit D. All of her labs are within acceptable range.   Kidneys normal and liver enzymes are completely normal. Inflammation levels are elevated. The repeat CHELSEA is neg.   X-rays: Hand and feet xrays all show degenerative changes (we use these more for baseline) and the chest xray is completely normal.    The plan will be to start Leflunomide. It will be one tablet daily after supper. This rx was sent to her pharmacy.

## 2024-05-06 DIAGNOSIS — M06.00 SERONEGATIVE RHEUMATOID ARTHRITIS: Primary | ICD-10-CM

## 2024-05-06 DIAGNOSIS — M79.7 FIBROMYALGIA: ICD-10-CM

## 2024-05-06 RX ORDER — TIZANIDINE 4 MG/1
4 TABLET ORAL NIGHTLY
Qty: 30 TABLET | Refills: 5 | Status: SHIPPED | OUTPATIENT
Start: 2024-05-06 | End: 2024-05-06 | Stop reason: SDUPTHER

## 2024-05-06 NOTE — TELEPHONE ENCOUNTER
Agree, I do not see the TB test. The TB test needs to from the last 1 year. The TB test is ordered. So, if the patient does call back and cannot tell you where she had it completed, you can send the labs to the location of her preference (if needed)  Please update me, Once I see the negative TB then I will send the message to  for authorization.  I refilled the Tizanidine that Dr. Lam previously prescribed for her, we can discuss the ongoing management of this med at her next appointment.

## 2024-05-06 NOTE — TELEPHONE ENCOUNTER
The patient did agree to try the Humira and also is requesting to help with pain. She did request a muscle relaxer and I did inform her that you are unable to give that medication, is there any type of pain medication that can be given to the patient or would she need to get with her PCP.   The patient stated she did complete a TB test and it was faxed to our office, I checked in media and it was not there. So, I informed the patient to see where she completed the test and get them to fax it to our office or call the office back with the name of the office so we can request the labs. Please Advise

## 2024-05-06 NOTE — TELEPHONE ENCOUNTER
OK, if she is unable to tolerate the medication, the next stop in the upgrade in treatment is a biologic named Humira. It is an injection she would take every 2 weeks at home. I believe we discussed this possibility at her last visit. The only we would need is a negative tuberculosis test to start this medication. I see I ordered it with her blood work last month but that was the  only test not done. If she is amicable, I'll order the TB test and please ask her where she would like to have the lab completed and you can fax it there (she lives in [place], [place]).   Please also let her know that we received her cardiology notes.

## 2024-05-06 NOTE — TELEPHONE ENCOUNTER
Called patient and she did have a pharmacy change and she gave me the office to call. Spoke with [facility] and they will be faxing TB from 4/8/24.

## 2024-05-07 RX ORDER — TIZANIDINE 4 MG/1
4 TABLET ORAL NIGHTLY
Qty: 30 TABLET | Refills: 5 | Status: SHIPPED | OUTPATIENT
Start: 2024-05-07

## 2024-05-07 RX ORDER — ADALIMUMAB 40MG/0.4ML
40 KIT SUBCUTANEOUS
Qty: 2 PEN | Refills: 11 | Status: SHIPPED | OUTPATIENT
Start: 2024-05-07 | End: 2024-05-10 | Stop reason: SDUPTHER

## 2024-05-07 NOTE — TELEPHONE ENCOUNTER
TB result received (scanned into media). Humira Rx sent to  pharmacy and Tizanidine sent to [facility] [facility]

## 2024-05-10 DIAGNOSIS — M06.00 SERONEGATIVE RHEUMATOID ARTHRITIS: ICD-10-CM

## 2024-05-10 RX ORDER — ADALIMUMAB 40MG/0.4ML
40 KIT SUBCUTANEOUS
Qty: 2 PEN | Refills: 11 | Status: SHIPPED | OUTPATIENT
Start: 2024-05-10

## 2024-06-25 ENCOUNTER — TELEPHONE (OUTPATIENT)
Dept: RHEUMATOLOGY | Facility: CLINIC | Age: 64
End: 2024-06-25
Payer: MEDICAID

## 2024-06-25 NOTE — TELEPHONE ENCOUNTER
Patient started taking the Humira this would be her fourth injection. She has been retaining fluid in her hands and feet. She also has a lymph node under her right arm. She is now taking a antibiotic from her Pcp  MARAH Banda for the lymph node. Her pcp did advise her to call our office being that the Humira maybe the reasoning of her side effects.  Please Advise. Thanks

## 2024-06-25 NOTE — TELEPHONE ENCOUNTER
Spoke with patient she was told to stop the lasix 6 months ago  and re started it a week ago , when when she noticed she was swelling. She is requesting a refill of lasix and verbalized understanding of your message.

## 2024-06-25 NOTE — TELEPHONE ENCOUNTER
She should stop Humira if she has infection or on antibiotics. Please have her hold the Humira and continue antibiotic treatment for the infection. In addition, please ask her if the swelling in her hands just started, and ask her if she takes Lasix ever day (according to her current med list).    While she is holding the Humira, I will review this will the Rheumatology Clinic pharmacist and once we have the answers to the above questions we can call her back on Thursday.  Thanks

## 2024-06-26 NOTE — TELEPHONE ENCOUNTER
No, the Lasix is managed by her PCP or cardiologist. She needs to contact one of them to manage/prescribe the Lasix.    According to this phone encounter, she has been taking Humira for almost 6 weeks (she was about to take 4th injection) and noticed the swelling in her hands about 1 week ago and started taking Lasix again one week ago? Just confirming this is right for when the pharmacist reviews it tomorrow. We will be calling to discuss tomorrow.

## 2024-06-26 NOTE — TELEPHONE ENCOUNTER
Spoke with the patient, yes this would of been her fourth injection . She noticed the swelling about 2 weeks ago and started the lasix 2 weeks ago . She did verbalize understanding regarding the lasix refill and will contact her pcp.

## 2024-06-27 NOTE — TELEPHONE ENCOUNTER
"Called and spoke with the patient, she was made aware the rheumatologist pharmacist was present.  She reported her last dose of Humira was June 11th.  Reports she had swollen lymph node under right axilla and was treated with antibiotics with resultant.  After completing the antibiotics the lymph node enlarged again.  Reports she is being scheduled for an ultrasound for further workup by her PCP. She does report previously having a swollen lymph node when she first starting seeing Dr. Gray and that the lymph node enlarged when she started taking "medications for Arhtritis" but unsure what medication. In review, I am unable to find lymphadenopathy in previous Rheumatology notes from Dr. Lam. She does report the swelling in the hands started about 2 weeks ago.  She reports she was previously on Lasix every day in about 6 months ago she self discontinued the Lasix due to effects of Jardiance with eliminating increase fluid.  Reports Lasix was previously used for congestive heart failure.    In discussing the medications, she reports that she never received leflunomide from the pharmacy (it was sent in April) therefore has not started taking leflunomide.  She is unable to take prednisone due to AFib and retention of fluid.  She is instructed to complete workup for enlarged lymph node with primary care and to not start taking the leflunomide.  She should continue holding the Humira and instructed her to keep us updated so we can guide treatment for RA.  "

## 2024-07-10 NOTE — PROGRESS NOTES
Patient ID: [id number]     Chief Complaint: Follow-up (Stopped all medications due to a growth under her arm . She just completed antibiotics. . /She did find out the issue it is a cyst that will need to be surgically removed. Joint pain . )      HPI:     This is a telemedicine note. Patient was treated using telemedicine, real time audio and video, according to [facility] protocols. ISasha, conducted the visit from the [facility]. The patient participated in the visit at a non-[facility] location selected by the patient, identified below. I am licensed in the state where the patient stated they are located. The patient stated that they understood and accepted the privacy and security risks to their information at their location. This visit is not recorded.    Patient was located at the patient's home.      Shayy Chavez is a 63 y.o. female here today for a telemedicine visit.   Ms. Chavez is a 64 y/o female here for a f/u. She was originally a self referral for joint pain. She established care with Dr. Lam 7/5/22.  Reports hand pain started in 2012- reports she was diagnosed with RA from xrays and blood works.   Past labs: 5/13/22- Anti-centromere Ab neg, Anti-RNP neg, Anti Smith Ab neg, Eufemia-1 Ab neg, Ribosomal P Ab neg, Scleroderma Ab neg, Sjogrens SSA neg, Sjogrens SSB neg, SSA60 Ab IGG neg, CCP neg,    10/14/22- CHELSEA 1:80 Speckled, dsDNA neg, Smith neg, RF neg CCP neg, RF neg  Past treatment: July 2022 Initiated on  mg BID at initial visit. In November 2022 initiated MTX. MTX was discontinued in January 2023 d/t side effects of nausea, vomiting and swelling. Leflunomide was Initiated Jan 2023     Hx of MI 2011- stent   CHF? 9/1/2023- LV EF 55-60%, LVH, LAE, DD, mild MR/TR (echo on last page of scanned media) OK to use TNFi  Followed by Cardiology Dr Suzanne CALERO on Eliquis and ASA  Followed by GI- Dr Merino     Denies history of fevers, rashes,  photosensitivity, oral or nasal ulcers,stroke, seizures, h/o PE or DVT, Raynaud's phenomenon, uveitis, malignancies.      Family history of autoimmune disease: Brother and sister have RA  Pregnancies: 3  Miscarriages: 1 (3 months)  Smoking: Previous Smoker for 8-10 years. Quit 2011     Today: First time evaluated by me. Last visit was 1.5 years ago November 2022. She is not on any treatment. C/o of joint pain in hands, knees, ankles, shoulders. Bilateral hand are weak on . She tries to stay activity with walking in yard. Morning stiffness: more than 3-4 hours. Endorses red/warm/swollen joints. She is unable to wear rings regularly due to swelling. Active synovitis on exam today.  Intentional weight loss recently by eating healthy and increased activity. She also reports she recently completed physical therapy for her lower back with improvement.    Today July 2024:    She is currently not taking Leflunomide or Arava. Patient reoprts she had swollen lymph node under R axilla and was treated with abx by PCP with resolution. After completed antibiotics the lymph node enlarged again. She completed Ultrasound . In review of the chart she did previously have lymphadenopathy charted in a previous Rheumatologist note by Dr. Lam. Reports she started having swelling in her hands and self resumed Lasix. Reports she never started Lefluonmide. Last dose of Humira was June 11th. On 6/25/24 via telephone she was instructed to complete work up for enlargement under axilla with PCP and continue holding the Humira. She reports she completed the ultrasound under right arm and was told it is a cyst that needs to be surgically removed. She has been referred is waiting to see a general surgeon for removal. Reports plans to send to pathology after removing.   Reports morning stiffness about 60 minutes. Improvement with movement. It has started waking her up at night. She is active during the day so less pain during the day.  Reports fingers have started locking up on right hand and continues with a weak . Denies night sweats, recent infections, endorses 3 rounds of antibiotics for current enlargement under right axilla. Will send AALIYAH to PCP for records.    Past Medical History:   Diagnosis Date    Arthritis     Atrial fibrillation     Diabetes mellitus         Past Surgical History:   Procedure Laterality Date    APPENDECTOMY      CHOLECYSTECTOMY      COLONOSCOPY W/ BIOPSIES      CORONARY ANGIOPLASTY WITH STENT PLACEMENT      HYSTERECTOMY          Social History     Tobacco Use    Smoking status: Former     Types: Cigarettes    Smokeless tobacco: Never   Substance and Sexual Activity    Alcohol use: Not Currently    Drug use: Not Currently    Sexual activity: Not Currently        Current Outpatient Medications   Medication Instructions    ALPRAZolam (XANAX) 0.25 mg, Oral, 2 times daily PRN    azelastine (ASTELIN) 137 mcg (0.1 %) nasal spray 2 sprays, Nasal, As needed (PRN)    diltiaZEM (CARDIZEM) 30 mg, Oral, Daily    ELIQUIS 5 mg, Oral, 2 times daily    fluticasone propionate (FLONASE) 50 mcg/actuation nasal spray 2 sprays, Each Nostril, As needed (PRN)    furosemide (LASIX) 20 mg, Oral, Daily    JARDIANCE 10 mg, Oral, Daily    pantoprazole (PROTONIX) 40 mg, Oral, Daily    tiZANidine (ZANAFLEX) 4 mg, Oral, Nightly, At bedtime    TRUE METRIX GLUCOSE TEST STRIP Strp SMARTSIG:Via Meter 2-3 Times Daily       Review of patient's allergies indicates:   Allergen Reactions    Azithromycin Anaphylaxis and Rash     Tongue swelling  Tongue swelling      Sulfa (sulfonamide antibiotics) Hives    Venlafaxine Hives and Swelling    Diphenhydramine hcl      Other reaction(s): Other (See Comments), Other (See Comments), Respiratory Distress  When mixed with medications  When mixed with medications      Hydromorphone      Other reaction(s): Chest Pain    Statins-hmg-coa reductase inhibitors Other (See Comments)     Muscle cramps in the legs .    insomnia        Patient Care Team:  Louis Palacios II, NP as PCP - General (General Practice)     Subjective:     Review of Systems    12 point review of systems conducted, negative except as stated in the history of present illness. See HPI for details.    Objective:     There were no vitals taken for this visit.    Physical Exam    Physical Exam: LIMITED DUE TO TELEMEDICINE RESTRICTIONS.  General: Alert and oriented, No acute distress.  Head: Normocephalic, Atraumatic.  Eye: Sclera non-icteric.  Neck/Thyroid:  Full range of motion.  Respiratory: Non-labored respirations, Symmetrical chest wall expansion.  Musculoskeletal: Normal range of motion. Able to make fist in both hands. MCP/PIP are not red or swollen.  Integumentary:  No visible suspicious lesions or rashes. No diaphoresis.   Neurologic: No focal deficits  Psychiatric: Normal interaction, Coherent speech, Mood appropriate, Appropriate affect     Assessment:       ICD-10-CM ICD-9-CM   1. Seronegative rheumatoid arthritis  M06.00 714.0   2. Cyst of skin  L72.9 706.2   3. Positive CHELSEA (antinuclear antibody)  R76.8 795.79   4. Fibromyalgia  M79.7 729.1   5. Primary insomnia  F51.01 307.42   6. Atrial fibrillation, unspecified type  I48.91 427.31        Plan:     1. Seronegative rheumatoid arthritis  Overview:  Avoid prednisone- A FIB  Avoid TNFi (CHF, with pEF) 9/1/2023- LV EF 55-60%, LVH, LAE, DD, mild MR/TR (echo on last page of scanned media)    Assessment & Plan:  RF neg, CCP neg. Past treatment: July 2022 Initiated on  mg BID at initial visit. In November 2022 initiated MTX. MTX was discontinued in January 2023 d/t side effects of nausea, vomiting and swelling. Leflunomide was Initiated Jan 2023 and at her last visit she was not taking it.    Leflunomide was restarted and Humira was eventually prescribed. Reports she never restarted Leflunomide (d/t pharmacy) and Last dose of Humira was 6/11/2024. (She took a total of 3 doses). Reports she  started having enlargement under axilla and completed work up for that. She reports that it is actually a cyst that was enlarged and not a swollen lymph node. She has been on 3 cycles of antibiotics which resolves it but when she gets off of the antibiotic, then the cyst fills back up with fluid. She is waiting for an appointment to be evaluated by general surgery for removal of cyst and sent to pathology.    She reports since being off of the medication she is having increased joint pain, especially during the night. We are avoiding steroids d/t A-Fib. The plan is to continue holding all RA treatment/immunosuppressives. We will let her continue to be evaluated and have the cyst removed and sent for pathology. At that time we can review treatment options for RA. Patient is amicable to this plan.  AALIYAH- to request ultrasound and notes from PCP.     Xray of bilateral hands, bilateral feet and CXR completed April 2024  Evaluate if she has CHF/ using Lasix to consider another options besides TNF    F/u 2 months      2. Cyst of skin  Assessment & Plan:  Reports recent ultrasound under right axilla in which she was told it was a cyst that needs to be surgically removed.  She was told it was not an enlarged lymph node.  Reports she has completed 3 rounds of antibiotics for this particular cyst.  Reports cyst resolves after antibiotics but once antibiotics is stopped the cyst refills with fluid.  She has been referred to General surgery by PCP for surgical removal.    AALIYAH sent to PCP for ultrasound and notes regarding this cyst. I am unable to see this cyst on virtual visit today.       3. Positive CHELSEA (antinuclear antibody)  Assessment & Plan:  CHELSEA 1:80 Speckled 2022     Repeat CHELSEA neg, dsDNA neg, RF neg, RNA Polymerase III neg      4. Fibromyalgia  Assessment & Plan:  Stable  Continue Tizanidine 4 mg nightly      5. Primary insomnia  Assessment & Plan:  Avoid caffeine, alcohol and stimulants.  Power down electronic devices  at least one hour prior to bedtime.  Keep room dark; use eye mask or relaxation sound machine to promote rest.  Sleep hygiene refers to actions that tend to improve and maintain good sleep:  Sleep as long as necessary to feel rested (usually seven to eight hours for adults) and then get out of bed  Maintain a regular sleep schedule, particularly a regular wake-up time in the morning  Avoid smoking or other nicotine intake, particularly during the evening       6. Atrial fibrillation, unspecified type  Assessment & Plan:  On Eliquis  Followed by Cardiology  Avoid prednisone           Follow up in about 2 months (around 9/11/2024) for Virtual Visit. In addition to their scheduled follow up, the patient has also been instructed to follow up on as needed basis.     No future appointments.       Video Time Documentation:  Spent 16 minutes with patient face to face discussed health concerns.    41 minutes of total time spent on the encounter, which includes face to face time and non-face to face time preparing to see the patient (eg, review of tests), Obtaining and/or reviewing separately obtained history, Documenting clinical information in the electronic or other health record, Independently interpreting results (not separately reported) and communicating results to the patient/family/caregiver, or Care coordination (not separately reported).    TERESITA Busby

## 2024-07-10 NOTE — ASSESSMENT & PLAN NOTE
RF neg, CCP neg. Past treatment: July 2022 Initiated on  mg BID at initial visit. In November 2022 initiated MTX. MTX was discontinued in January 2023 d/t side effects of nausea, vomiting and swelling. Leflunomide was Initiated Jan 2023 and at her last visit she was not taking it.    Leflunomide was restarted and Humira was eventually prescribed. Reports she never restarted Leflunomide (d/t pharmacy) and Last dose of Humira was 6/11/2024. (She took a total of 3 doses). Reports she started having enlargement under axilla and completed work up for that. She reports that it is actually a cyst that was enlarged and not a swollen lymph node. She has been on 3 cycles of antibiotics which resolves it but when she gets off of the antibiotic, then the cyst fills back up with fluid. She is waiting for an appointment to be evaluated by general surgery for removal of cyst and sent to pathology.    She reports since being off of the medication she is having increased joint pain, especially during the night. We are avoiding steroids d/t A-Fib. The plan is to continue holding all RA treatment/immunosuppressives. We will let her continue to be evaluated and have the cyst removed and sent for pathology. At that time we can review treatment options for RA. Patient is amicable to this plan.  AALIYAH- to request ultrasound and notes from PCP.     Xray of bilateral hands, bilateral feet and CXR completed April 2024  Evaluate if she has CHF/ using Lasix to consider another options besides TNF    F/u 2 months

## 2024-07-11 ENCOUNTER — OFFICE VISIT (OUTPATIENT)
Dept: RHEUMATOLOGY | Facility: CLINIC | Age: 64
End: 2024-07-11
Payer: MEDICAID

## 2024-07-11 DIAGNOSIS — F51.01 PRIMARY INSOMNIA: ICD-10-CM

## 2024-07-11 DIAGNOSIS — M06.00 SERONEGATIVE RHEUMATOID ARTHRITIS: Primary | ICD-10-CM

## 2024-07-11 DIAGNOSIS — M79.7 FIBROMYALGIA: ICD-10-CM

## 2024-07-11 DIAGNOSIS — R76.8 POSITIVE ANA (ANTINUCLEAR ANTIBODY): ICD-10-CM

## 2024-07-11 DIAGNOSIS — I48.91 ATRIAL FIBRILLATION, UNSPECIFIED TYPE: ICD-10-CM

## 2024-07-11 DIAGNOSIS — L72.9 CYST OF SKIN: ICD-10-CM

## 2024-07-11 RX ORDER — AZELASTINE 1 MG/ML
2 SPRAY, METERED NASAL
COMMUNITY
Start: 2024-05-28

## 2024-07-11 RX ORDER — CALCIUM CITRATE/VITAMIN D3 200MG-6.25
TABLET ORAL
COMMUNITY
Start: 2024-06-11

## 2024-07-11 RX ORDER — FLUTICASONE PROPIONATE 50 MCG
2 SPRAY, SUSPENSION (ML) NASAL
COMMUNITY
Start: 2024-05-28

## 2024-07-11 NOTE — ASSESSMENT & PLAN NOTE
Avoid caffeine, alcohol and stimulants.  Power down electronic devices at least one hour prior to bedtime.  Keep room dark; use eye mask or relaxation sound machine to promote rest.  Sleep hygiene refers to actions that tend to improve and maintain good sleep:  Sleep as long as necessary to feel rested (usually seven to eight hours for adults) and then get out of bed  Maintain a regular sleep schedule, particularly a regular wake-up time in the morning  Avoid smoking or other nicotine intake, particularly during the evening

## 2024-07-12 ENCOUNTER — TELEPHONE (OUTPATIENT)
Dept: RHEUMATOLOGY | Facility: CLINIC | Age: 64
End: 2024-07-12
Payer: MEDICAID

## 2024-07-12 PROBLEM — L72.9 CYST OF SKIN: Status: ACTIVE | Noted: 2024-07-12

## 2024-07-12 NOTE — ASSESSMENT & PLAN NOTE
Reports recent ultrasound under right axilla in which she was told it was a cyst that needs to be surgically removed.  She was told it was not an enlarged lymph node.  Reports she has completed 3 rounds of antibiotics for this particular cyst.  Reports cyst resolves after antibiotics but once antibiotics is stopped the cyst refills with fluid.  She has been referred to General surgery by PCP for surgical removal.    AALIYAH sent to PCP for ultrasound and notes regarding this cyst. I am unable to see this cyst on virtual visit today.

## 2024-07-12 NOTE — TELEPHONE ENCOUNTER
----- Message from TERESITA Busby sent at 7/12/2024  8:53 AM CDT -----  Completed virtual visit yesterday--  We will send the AALIYAH to her PCP on file.  Please request recent ultrasound and last office note.     F/u in 2 months virtual visit

## 2024-10-10 DIAGNOSIS — M79.7 FIBROMYALGIA: ICD-10-CM

## 2024-10-14 RX ORDER — TIZANIDINE 4 MG/1
4 TABLET ORAL NIGHTLY
Qty: 30 TABLET | Refills: 5 | Status: SHIPPED | OUTPATIENT
Start: 2024-10-14

## 2025-04-10 DIAGNOSIS — M79.7 FIBROMYALGIA: ICD-10-CM

## 2025-04-11 RX ORDER — TIZANIDINE 4 MG/1
4 TABLET ORAL NIGHTLY
Qty: 30 TABLET | Refills: 5 | OUTPATIENT
Start: 2025-04-11